# Patient Record
Sex: MALE | Race: BLACK OR AFRICAN AMERICAN
[De-identification: names, ages, dates, MRNs, and addresses within clinical notes are randomized per-mention and may not be internally consistent; named-entity substitution may affect disease eponyms.]

---

## 2020-12-31 ENCOUNTER — HOSPITAL ENCOUNTER (EMERGENCY)
Dept: HOSPITAL 62 - ER | Age: 40
Discharge: HOME | End: 2020-12-31
Payer: COMMERCIAL

## 2020-12-31 VITALS — SYSTOLIC BLOOD PRESSURE: 140 MMHG | DIASTOLIC BLOOD PRESSURE: 72 MMHG

## 2020-12-31 DIAGNOSIS — Z20.828: ICD-10-CM

## 2020-12-31 DIAGNOSIS — R09.81: Primary | ICD-10-CM

## 2020-12-31 DIAGNOSIS — I10: ICD-10-CM

## 2020-12-31 DIAGNOSIS — Z79.899: ICD-10-CM

## 2020-12-31 DIAGNOSIS — J44.9: ICD-10-CM

## 2020-12-31 DIAGNOSIS — E11.9: ICD-10-CM

## 2020-12-31 DIAGNOSIS — R05: ICD-10-CM

## 2020-12-31 PROCEDURE — 99283 EMERGENCY DEPT VISIT LOW MDM: CPT

## 2020-12-31 NOTE — ER DOCUMENT REPORT
ED General





- General


Chief Complaint: Congestion


Stated Complaint: COUGH,CONGESTION


TRAVEL OUTSIDE OF THE [place] IN LAST 30 DAYS: No





- HPI


Notes: 





Chief Complaint: Cough and congestion





Historian: History obtained from patient





HPI:  This is a 40-year-old male presents to the ER complaining of cough, 

congestion x2 days. Patient says he had some temporary loss of taste and smell 

the other day but seems to return. He denies fever chills, chest pain, shortness

of breath, abdominal pain, nausea vomiting diarrhea. Patient does have a history

of asthma and says he was wheezing earlier today has been using at home 

albuterol inhaler with some relief. Has not had any known Covid contacts but 

does go to work around people daily. Patient's son is also being evaluated right

now and has similar symptoms as him. Patient son symptoms started today. He has 

also had no known sick contacts.





ROS: 


Constitutional: no fevers.


HEENT: Nasal congestion


CV: no chest pain or palpitations.


Resp: Dry cough.


GI: no abdominal pain, or  n/v/d.


: no dysuria,  hematuria,  or incont.


MSK: no back pain, no joint swelling/redness.


Skin: no rashes or itching.


Neuro: no seizures, weakness, numbness, or confusion.


Hematological: no ecchymosis  or easy bleeding.


Endocrine: no polyuria/polydipsia, no heat/cold intolerance. 


Psych: no SI/HI, AH/VH or memory loss. 





PMHx: Reviewed and agree as charted by RN.


PSHx:  Reviewed and agree as charted by RN.


SOCHx: Reviewed and agree as charted by RN.


FHX: No significant familial comorbid conditions directly related to patient 

complaint





Current Medications: Reviewed and agree with the patient medications as charted 

by the RN.





Allergies: Reviewed and agree with the listed allergies as charted by the RN








Physical Exam:





Vitals: Reviewed in chart as documented by RN. 





General:  Alert and in NAD. 


Head:  Normocephalic; atraumatic


Eyes:  PERRLA, Conjunctivae clear  sclerae non-icteric bilat


ENT: Nosemucosa erythematous and inflamed. No active drainage. Patent 

bilateral. Mild bilateral maxillary tenderness.


Neck: trachea midline,  no unilateral swelling/tenderness/lymphadenopathy


CV: RRR, no M/R/G; symmetric distal pulses


Resp: Mild coarse lung sounds to bilateral upper fields, good air movement 

bilaterally. No rhonchi stridor or active wheezing.


GI:  abd soft and nondistended. NTTP.  normal BS.  no masses/HSM. no CVAT bilat


MSK:  FROM of all extremities. No midline CTL spine tenderness/deformity


Skin: warm, moist, good turgor. no rash/lesions


Neuro:  Alert and oriented X 4. following CN 2-12 intact.  no unilateral 

weakness/numbness


Psych:  No SI/HI or AH/VH. 


 


 


ED Results:





Medical Decision-Making:


Medical Decision-making/Differential Diagnosis:


Consider various etiologies including but not limited to Covid, viral syndrome, 

bronchitis, URI, pneumonia, asthma exacerbation, sinusitis , strep pharyngitis, 

viral pharyngitis, other pharyngitis, mable-tonsillar abscess (unlikely), 

retropharyngeal abscess (unlikely), Acute Suppurative Otitis media, otalgia, 

upper respiratory infection, viral syndrome, bronchitis, sinusitis, ect





Plan-we will get Covid testsend out. The patient a dose of steroids in the ER 

and discharge with a prescription for steroids and some over-the-counter 

medication for congestion. Also will try to order patient in albuterol nebulizer

at his request. Patient says he has a home albuterol inhaler that he is almost 

out of but says he gets significantly more relief with the nebulizer. He will 

self quarantine at home until he is called with results. Strict return factors 

discussed. His oxygen sats are 99% on room air no respiratory distress. Patient 

is nontoxic-appearing and appropriate for outpatient management. Charge factors 

were discussed PCP follow-up this week for recheck





This course of action was discussed with the patient and/or family. They were 

amenable to this, verbalized understanding, and were without further questions.








- Related Data


Allergies/Adverse Reactions: 


                                        





No Known Allergies Allergy (Unverified 03/27/13 18:45)


   











Past Medical History





- Social History


Smoking Status: Never Smoker


Family History: Arthritis, DM, Malignancy





- Past Medical History


Cardiac Medical History: Reports: Hx Hypertension - does not take meds


Pulmonary Medical History: Reports: Hx Asthma, Hx COPD


Neurological Medical History: Reports: Hx Migraine, Hx Seizures


Endocrine Medical History: Reports: Hx Diabetes Mellitus Type 2


Musculoskeletal Medical History: Reports Hx Arthritis, Reports Hx 

Musculoskeletal Deformity, Reports Hx Musculoskeletal Trauma


Traumatic Medical History: Reports: Hx Spine Fracture





- Immunizations


Hx Diphtheria, Pertussis, Tetanus Vaccination: Yes





Physical Exam





- Vital signs


Vitals: 


                                        











Temp Pulse Resp BP Pulse Ox


 


 98.3 F   73   14   137/85 H  100 


 


 12/31/20 08:52  12/31/20 08:52  12/31/20 08:52  12/31/20 08:52  12/31/20 08:52














Course





- Re-evaluation


Re-evalutation: 





12/31/20 11:45


Patient refused Covid testing when RN was trying to administer test.  She says 

she did not even enter the nare with the swab when the patient suddenly pulled 

back and refused.  We will canceled Covid tests.  Patient was educated about 

risks associated with not being tested as well as risk of transmission to other 

people if he is not tested and is actually Covid positive.  Encourage patient to

get tested at his convenience soon as possible.  DC patient with nebulizer and 

albuterol while prescriptions as well as steroid burst.  He may use 

over-the-counter cough and cold medicines for congestion.  PCP follow-up this 

coming week return factors discussed.





- Vital Signs


Vital signs: 


                                        











Temp Pulse Resp BP Pulse Ox


 


 98.3 F   73   14   137/85 H  100 


 


 12/31/20 08:52  12/31/20 08:52  12/31/20 08:52  12/31/20 08:52  12/31/20 08:52














- Laboratory Results


Critical Laboratory Results Reviewed: No Critical Results





- Radiology Results


Critical Radiology Results Reviewed: No Critical Results





Discharge





- Discharge


Clinical Impression: 


 Cough, Nasal congestion





Condition: Stable


Disposition: HOME, SELF-CARE


Instructions:  COVID-19 Guidance for Persons Under Investigation


Additional Instructions: 


Self quarantine at home until you are called with your Covid results and further

instructions. do not return to work unless you have a negative covid test or if 

you are covid positive you need to wait 10 days and then go 3 days without 

fevers or symptoms without having taken any medications. May take Tylenol for 

headaches, body aches, or fever/chills. Drink plenty of fluids to stay hydrated.

Take medications as prescribed. Follow-up with your primary care doctor this 

week for recheck. Return to the ER if your condition worsens.


Prescriptions: 


Albuterol Sulfate [Ventolin 0.083% Neb 2.5 mg/3 mL Ampul] 1 vial NEB Q4HP PRN 

#60 vial


 PRN Reason: 


Prednisone [Deltasone 20 mg Tablet] 60 mg PO DAILY 5 Days #15 tablet


Nebulizer and Compressor [Portable Nebulizer System] 1 each MC ASDIR PRN #1 each


 PRN Reason: 


Forms:  Return to Work